# Patient Record
Sex: FEMALE | URBAN - METROPOLITAN AREA
[De-identification: names, ages, dates, MRNs, and addresses within clinical notes are randomized per-mention and may not be internally consistent; named-entity substitution may affect disease eponyms.]

---

## 2020-10-06 ENCOUNTER — OFFICE VISIT (OUTPATIENT)
Dept: URBAN - METROPOLITAN AREA CLINIC 13 | Facility: CLINIC | Age: 85
End: 2020-10-06
Payer: MEDICARE

## 2020-10-06 DIAGNOSIS — Z96.1 PRESENCE OF INTRAOCULAR LENS: ICD-10-CM

## 2020-10-06 DIAGNOSIS — H53.10 SUBJECTIVE VISUAL DISTURBANCES: Primary | ICD-10-CM

## 2020-10-06 PROCEDURE — 92134 CPTRZ OPH DX IMG PST SGM RTA: CPT | Performed by: OPHTHALMOLOGY

## 2020-10-06 PROCEDURE — 99204 OFFICE O/P NEW MOD 45 MIN: CPT | Performed by: OPHTHALMOLOGY

## 2020-10-06 PROCEDURE — 92235 FLUORESCEIN ANGRPH MLTIFRAME: CPT | Performed by: OPHTHALMOLOGY

## 2020-10-06 ASSESSMENT — INTRAOCULAR PRESSURE
OS: 9
OD: 12

## 2020-10-06 NOTE — IMPRESSION/PLAN
Impression: Subjective visual disturbances: H53.10. OCT OU = no SRF/IRF OU
FA OU 10/6/2020 = good vascular fill, no cap dropout, no leak/stain OU Plan: Fortunately, no retinal pathology on exam/imaging today. D/w patient. Rec follow-up with primary care and neurology, given recent vision loss and fainting episode. Retinal tear/detachment signs/symptoms d/w patient. Patient knows to call ASAP with change in symptoms. Rec yearly dilated exam with Dr. Kyle Henderson Call ASAP with changes.